# Patient Record
Sex: MALE | URBAN - METROPOLITAN AREA
[De-identification: names, ages, dates, MRNs, and addresses within clinical notes are randomized per-mention and may not be internally consistent; named-entity substitution may affect disease eponyms.]

---

## 2017-09-22 ENCOUNTER — IMPORTED ENCOUNTER (OUTPATIENT)
Dept: URBAN - METROPOLITAN AREA CLINIC 50 | Facility: CLINIC | Age: 82
End: 2017-09-22

## 2017-10-04 ENCOUNTER — IMPORTED ENCOUNTER (OUTPATIENT)
Dept: URBAN - METROPOLITAN AREA CLINIC 50 | Facility: CLINIC | Age: 82
End: 2017-10-04

## 2017-10-18 ENCOUNTER — IMPORTED ENCOUNTER (OUTPATIENT)
Dept: URBAN - METROPOLITAN AREA CLINIC 50 | Facility: CLINIC | Age: 82
End: 2017-10-18

## 2017-11-30 ENCOUNTER — IMPORTED ENCOUNTER (OUTPATIENT)
Dept: URBAN - METROPOLITAN AREA CLINIC 50 | Facility: CLINIC | Age: 82
End: 2017-11-30

## 2018-01-16 ENCOUNTER — IMPORTED ENCOUNTER (OUTPATIENT)
Dept: URBAN - METROPOLITAN AREA CLINIC 50 | Facility: CLINIC | Age: 83
End: 2018-01-16

## 2018-04-25 ENCOUNTER — IMPORTED ENCOUNTER (OUTPATIENT)
Dept: URBAN - METROPOLITAN AREA CLINIC 50 | Facility: CLINIC | Age: 83
End: 2018-04-25

## 2018-06-18 ENCOUNTER — IMPORTED ENCOUNTER (OUTPATIENT)
Dept: URBAN - METROPOLITAN AREA CLINIC 50 | Facility: CLINIC | Age: 83
End: 2018-06-18

## 2018-06-28 ENCOUNTER — IMPORTED ENCOUNTER (OUTPATIENT)
Dept: URBAN - METROPOLITAN AREA CLINIC 50 | Facility: CLINIC | Age: 83
End: 2018-06-28

## 2018-07-03 ENCOUNTER — IMPORTED ENCOUNTER (OUTPATIENT)
Dept: URBAN - METROPOLITAN AREA CLINIC 50 | Facility: CLINIC | Age: 83
End: 2018-07-03

## 2018-12-20 ENCOUNTER — IMPORTED ENCOUNTER (OUTPATIENT)
Dept: URBAN - METROPOLITAN AREA CLINIC 50 | Facility: CLINIC | Age: 83
End: 2018-12-20

## 2019-07-18 ENCOUNTER — IMPORTED ENCOUNTER (OUTPATIENT)
Dept: URBAN - METROPOLITAN AREA CLINIC 50 | Facility: CLINIC | Age: 84
End: 2019-07-18

## 2019-08-09 ENCOUNTER — IMPORTED ENCOUNTER (OUTPATIENT)
Dept: URBAN - METROPOLITAN AREA CLINIC 50 | Facility: CLINIC | Age: 84
End: 2019-08-09

## 2019-08-15 ENCOUNTER — IMPORTED ENCOUNTER (OUTPATIENT)
Dept: URBAN - METROPOLITAN AREA CLINIC 50 | Facility: CLINIC | Age: 84
End: 2019-08-15

## 2019-09-10 ENCOUNTER — IMPORTED ENCOUNTER (OUTPATIENT)
Dept: URBAN - METROPOLITAN AREA CLINIC 50 | Facility: CLINIC | Age: 84
End: 2019-09-10

## 2020-03-10 ENCOUNTER — IMPORTED ENCOUNTER (OUTPATIENT)
Dept: URBAN - METROPOLITAN AREA CLINIC 50 | Facility: CLINIC | Age: 85
End: 2020-03-10

## 2020-03-11 NOTE — PATIENT DISCUSSION
2 WEEK PO: Patient is doing well post-operatively. The importance of post-op drop compliance was emphasized. Drop schedule reviewed with patient. Patient to call if any visual changes or concerns.

## 2020-04-09 NOTE — PATIENT DISCUSSION
3 WEEK PO: Patient is doing well post-operatively. The importance of post-op drop compliance was emphasized. Drop schedule reviewed with patient. Patient to call if any visual changes or concerns.

## 2020-09-10 ENCOUNTER — IMPORTED ENCOUNTER (OUTPATIENT)
Dept: URBAN - METROPOLITAN AREA CLINIC 50 | Facility: CLINIC | Age: 85
End: 2020-09-10

## 2021-03-11 ENCOUNTER — IMPORTED ENCOUNTER (OUTPATIENT)
Dept: URBAN - METROPOLITAN AREA CLINIC 50 | Facility: CLINIC | Age: 86
End: 2021-03-11

## 2021-04-18 ASSESSMENT — VISUAL ACUITY
OD_SC: 20/25-1
OD_OTHER: 20/50. 20/70.
OD_SC: 20/40
OD_CC: 20/20-2
OS_SC: 20/20
OD_BAT: 20/50
OS_SC: 20/25
OD_SC: 20/30
OS_SC: 20/30-2
OS_CC: 20/20-
OS_PH: @ 12 IN
OS_SC: 20/25
OS_CC: 20/20
OS_CC: J1+@ 16 IN
OD_CC: 20/20
OS_SC: 20/25
OD_SC: 20/30
OD_CC: 20/20-
OS_BAT: 20/70
OS_OTHER: 20/70. 20/80.
OS_SC: 20/20-1
OD_CC: 20/30
OS_CC: J1@ 12 IN
OS_CC: 20/25
OD_SC: 20/30
OD_CC: J1@ 12 IN
OD_SC: 20/25-2
OD_PH: @ 12 IN
OS_SC: 20/20
OS_CC: J1+
OS_SC: 20/25-
OS_PH: 20/30
OD_SC: 20/25-1
OD_SC: 20/40-1
OS_CC: 20/20-1
OD_CC: J1+
OD_CC: J1+@ 16 IN

## 2021-04-18 ASSESSMENT — PACHYMETRY
OD_CT_UM: 550
OS_CT_UM: 547
OS_CT_UM: 547
OD_CT_UM: 550
OS_CT_UM: 547
OS_CT_UM: 547
OD_CT_UM: 550
OS_CT_UM: 547
OD_CT_UM: 550
OD_CT_UM: 550
OS_CT_UM: 547
OS_CT_UM: 547
OD_CT_UM: 550
OD_CT_UM: 550
OS_CT_UM: 547
OD_CT_UM: 550
OS_CT_UM: 547
OD_CT_UM: 550
OS_CT_UM: 547
OD_CT_UM: 550
OS_CT_UM: 547
OD_CT_UM: 550
OD_CT_UM: 550
OS_CT_UM: 547
OD_CT_UM: 550
OD_CT_UM: 550
OS_CT_UM: 547
OS_CT_UM: 547
OD_CT_UM: 550

## 2021-04-18 ASSESSMENT — TONOMETRY
OS_IOP_MMHG: 16
OD_IOP_MMHG: 16
OS_IOP_MMHG: 17
OS_IOP_MMHG: 17
OD_IOP_MMHG: 20
OD_IOP_MMHG: 14
OS_IOP_MMHG: 12
OD_IOP_MMHG: 16
OD_IOP_MMHG: 17
OD_IOP_MMHG: 16
OS_IOP_MMHG: 17
OS_IOP_MMHG: 17
OD_IOP_MMHG: 14
OS_IOP_MMHG: 12
OD_IOP_MMHG: 16
OD_IOP_MMHG: 16
OS_IOP_MMHG: 16
OS_IOP_MMHG: 15

## 2021-09-23 ENCOUNTER — 6 MONTH FOLLOW-UP (OUTPATIENT)
Dept: URBAN - METROPOLITAN AREA CLINIC 49 | Facility: CLINIC | Age: 86
End: 2021-09-23

## 2021-09-23 DIAGNOSIS — H40.1131: ICD-10-CM

## 2021-09-23 PROCEDURE — 92133 CPTRZD OPH DX IMG PST SGM ON: CPT

## 2021-09-23 PROCEDURE — 92012 INTRM OPH EXAM EST PATIENT: CPT

## 2021-09-23 PROCEDURE — 92083 EXTENDED VISUAL FIELD XM: CPT

## 2021-09-23 ASSESSMENT — VISUAL ACUITY
OS_CC: 20/20
OS_GLARE: 20/30
OD_GLARE: 20/50
OD_CC: 20/25-2
OS_GLARE: 20/50
OD_GLARE: 20/30
OU_CC: J1+

## 2021-09-23 ASSESSMENT — TONOMETRY
OD_IOP_MMHG: 17
OS_IOP_MMHG: 15

## 2022-03-31 ENCOUNTER — FOLLOW UP (OUTPATIENT)
Dept: URBAN - METROPOLITAN AREA CLINIC 49 | Facility: CLINIC | Age: 87
End: 2022-03-31

## 2022-03-31 DIAGNOSIS — H04.123: ICD-10-CM

## 2022-03-31 DIAGNOSIS — H40.1131: ICD-10-CM

## 2022-03-31 DIAGNOSIS — H43.813: ICD-10-CM

## 2022-03-31 DIAGNOSIS — H26.493: ICD-10-CM

## 2022-03-31 DIAGNOSIS — H53.2: ICD-10-CM

## 2022-03-31 DIAGNOSIS — H35.373: ICD-10-CM

## 2022-03-31 PROCEDURE — 92014 COMPRE OPH EXAM EST PT 1/>: CPT

## 2022-03-31 PROCEDURE — 92134 CPTRZ OPH DX IMG PST SGM RTA: CPT

## 2022-03-31 ASSESSMENT — VISUAL ACUITY
OS_GLARE: 20/200
OD_GLARE: >20/400
OS_GLARE: >20/400
OD_GLARE: 20/200
OD_CC: 20/25-2
OS_CC: 20/25-2

## 2022-03-31 ASSESSMENT — TONOMETRY
OD_IOP_MMHG: 14
OS_IOP_MMHG: 12
OS_IOP_MMHG: 12
OD_IOP_MMHG: 14
